# Patient Record
Sex: MALE | Race: WHITE | NOT HISPANIC OR LATINO
[De-identification: names, ages, dates, MRNs, and addresses within clinical notes are randomized per-mention and may not be internally consistent; named-entity substitution may affect disease eponyms.]

---

## 2021-04-27 ENCOUNTER — DASHBOARD ENCOUNTERS (OUTPATIENT)
Age: 61
End: 2021-04-27

## 2021-04-27 ENCOUNTER — OFFICE VISIT (OUTPATIENT)
Dept: URBAN - METROPOLITAN AREA CLINIC 12 | Facility: CLINIC | Age: 61
End: 2021-04-27
Payer: COMMERCIAL

## 2021-04-27 VITALS
WEIGHT: 166 LBS | HEART RATE: 64 BPM | BODY MASS INDEX: 23.77 KG/M2 | TEMPERATURE: 97.3 F | HEIGHT: 70 IN | SYSTOLIC BLOOD PRESSURE: 132 MMHG | DIASTOLIC BLOOD PRESSURE: 83 MMHG

## 2021-04-27 DIAGNOSIS — R10.32 LLQ PAIN: ICD-10-CM

## 2021-04-27 DIAGNOSIS — Z12.11 COLON CANCER SCREENING: ICD-10-CM

## 2021-04-27 PROCEDURE — 99244 OFF/OP CNSLTJ NEW/EST MOD 40: CPT | Performed by: INTERNAL MEDICINE

## 2021-04-27 PROCEDURE — 99204 OFFICE O/P NEW MOD 45 MIN: CPT | Performed by: INTERNAL MEDICINE

## 2021-04-27 RX ORDER — ASPIRIN 81 MG/1
1 TABLET TABLET, CHEWABLE ORAL ONCE A DAY
Status: ACTIVE | COMMUNITY

## 2021-04-27 RX ORDER — CIPROFLOXACIN HYDROCHLORIDE 500 MG/1
1 TABLET TABLET, FILM COATED ORAL
Qty: 14 TABLET | OUTPATIENT
Start: 2021-04-27 | End: 2021-05-04

## 2021-04-27 RX ORDER — ROSUVASTATIN CALCIUM 10 MG/1
1 TABLET TABLET, FILM COATED ORAL ONCE A DAY
Status: ACTIVE | COMMUNITY

## 2021-04-27 RX ORDER — ONDANSETRON 8 MG/1
1 TABLET ON THE TONGUE AND ALLOW TO DISSOLVE  AS NEEDED TABLET, ORALLY DISINTEGRATING ORAL
Qty: 14 | Refills: 1 | OUTPATIENT
Start: 2021-04-27

## 2021-04-27 RX ORDER — TAMSULOSIN HYDROCHLORIDE 0.4 MG/1
1 CAPSULE CAPSULE ORAL ONCE A DAY
Status: ACTIVE | COMMUNITY

## 2021-04-27 RX ORDER — METRONIDAZOLE 500 MG/1
1 TABLET TABLET, FILM COATED ORAL
Qty: 14 TABLET | OUTPATIENT
Start: 2021-04-27 | End: 2021-05-04

## 2021-04-27 NOTE — HPI-TODAY'S VISIT:
60M ref by Dr Contreras for abd pain.  A copy of this document will be sent to the referring physician. [id number], hemorrhoids c/o LLQ abd pain. felt like a spasm. started 8 weeks ago. assoc with indigestion and constipation. intermittent. sometimes it will wake him up. feels gurgling in the stomach. no N/V.  BM every other day.  says he had CT scan done at [facility] which showed inflammation in sigmoid. he was started on hyoscymine. it seemed to help him. no abd surgery. no FH CA colon wt stable.

## 2021-05-20 ENCOUNTER — OFFICE VISIT (OUTPATIENT)
Dept: URBAN - METROPOLITAN AREA LAB 3 | Facility: LAB | Age: 61
End: 2021-05-20
Payer: COMMERCIAL

## 2021-05-20 DIAGNOSIS — Z12.11 COLON CANCER SCREENING: ICD-10-CM

## 2021-05-20 DIAGNOSIS — K62.1 DYSPLASTIC POLYP OF RECTUM: ICD-10-CM

## 2021-05-20 PROCEDURE — 45380 COLONOSCOPY AND BIOPSY: CPT | Performed by: INTERNAL MEDICINE

## 2021-05-20 RX ORDER — ASPIRIN 81 MG/1
1 TABLET TABLET, CHEWABLE ORAL ONCE A DAY
Status: ACTIVE | COMMUNITY

## 2021-05-20 RX ORDER — ONDANSETRON 8 MG/1
1 TABLET ON THE TONGUE AND ALLOW TO DISSOLVE  AS NEEDED TABLET, ORALLY DISINTEGRATING ORAL
Qty: 14 | Refills: 1 | Status: ACTIVE | COMMUNITY
Start: 2021-04-27

## 2021-05-20 RX ORDER — ROSUVASTATIN CALCIUM 10 MG/1
1 TABLET TABLET, FILM COATED ORAL ONCE A DAY
Status: ACTIVE | COMMUNITY

## 2021-05-20 RX ORDER — TAMSULOSIN HYDROCHLORIDE 0.4 MG/1
1 CAPSULE CAPSULE ORAL ONCE A DAY
Status: ACTIVE | COMMUNITY